# Patient Record
Sex: MALE | Race: WHITE | URBAN - METROPOLITAN AREA
[De-identification: names, ages, dates, MRNs, and addresses within clinical notes are randomized per-mention and may not be internally consistent; named-entity substitution may affect disease eponyms.]

---

## 2021-04-19 ENCOUNTER — OFFICE VISIT (OUTPATIENT)
Dept: URBAN - METROPOLITAN AREA CLINIC 56 | Facility: CLINIC | Age: 26
End: 2021-04-19
Payer: COMMERCIAL

## 2021-04-19 DIAGNOSIS — H52.13 MYOPIA, BILATERAL: Primary | ICD-10-CM

## 2021-04-19 PROCEDURE — 92004 COMPRE OPH EXAM NEW PT 1/>: CPT | Performed by: OPTOMETRIST

## 2021-04-19 ASSESSMENT — VISUAL ACUITY
OD: 20/20
OS: 20/20

## 2021-04-19 ASSESSMENT — INTRAOCULAR PRESSURE
OS: 17
OD: 16

## 2021-04-19 NOTE — IMPRESSION/PLAN
Impression: Myopia, bilateral: H52.13. Plan: Recommended glasses for optimal vision. New glasses prescription given to patient today.